# Patient Record
Sex: FEMALE | Race: WHITE | NOT HISPANIC OR LATINO | Employment: OTHER | URBAN - METROPOLITAN AREA
[De-identification: names, ages, dates, MRNs, and addresses within clinical notes are randomized per-mention and may not be internally consistent; named-entity substitution may affect disease eponyms.]

---

## 2023-08-02 ENCOUNTER — OFFICE VISIT (OUTPATIENT)
Dept: FAMILY MEDICINE | Facility: CLINIC | Age: 38
End: 2023-08-02
Payer: COMMERCIAL

## 2023-08-02 VITALS
SYSTOLIC BLOOD PRESSURE: 120 MMHG | HEART RATE: 91 BPM | DIASTOLIC BLOOD PRESSURE: 82 MMHG | TEMPERATURE: 98.3 F | OXYGEN SATURATION: 100 %

## 2023-08-02 DIAGNOSIS — S81.011A LACERATION OF RIGHT KNEE, INITIAL ENCOUNTER: Primary | ICD-10-CM

## 2023-08-02 PROCEDURE — 99203 OFFICE O/P NEW LOW 30 MIN: CPT

## 2023-08-02 RX ORDER — HYDROXYZINE HYDROCHLORIDE 25 MG/1
TABLET, FILM COATED ORAL
COMMUNITY

## 2023-08-02 NOTE — PROGRESS NOTES
Assessment & Plan     Laceration of right knee, initial encounter  Area was irrigated with sterile water, no foreign body seen, then Steri-Strips were placed.  We discussed suturing but was decided it would not be best since there is a chunk of skin missing and to pull the skin together would dimple the knee.  Is aware scarring is most likely to the knee.      15 minutes spent by me on the date of the encounter doing patient visit and documentation        Patient Instructions   Watch for signs of infection which include increased redness, swelling drainage or fever.  Should this happen seek treatment.  Steri-Strips will fall off on their own.  Do not scrub them.  You can shower as normal just pat the area dry.  Cover the area if you think it is getting it dirty    Return in about 5 days (around 8/7/2023), or if symptoms worsen or fail to improve.    [facility]    Madeline Chavez is a 37 year old, presenting for the following health issues:  Urgent Care and Laceration (Locate on her left knee. Pt trip on the escalator trying to drop her daughter on a flight and cut her left knee)      HPI     [place] and [place] the airport approximately 6 [place] ago sustaining 2 small lacerations to the right knee.  Tetanus shot was in 2022    Review of Systems   INTEGUMENTARY/SKIN: POSITIVE for wound right knee      Objective    /82   Pulse 91   Temp 98.3  F (36.8  C) (Tympanic)   SpO2 100%   There is no height or weight on file to calculate BMI.  Physical Exam   GENERAL: healthy, alert and no distress  SKIN: Laceration to right knee in the shape of a V that is approximately 8 to 9 mm in diameter.  Flap of skin is missing

## 2023-08-02 NOTE — PATIENT INSTRUCTIONS
Watch for signs of infection which include increased redness, swelling drainage or fever.  Should this happen seek treatment.  Steri-Strips will fall off on their own.  Do not scrub them.  You can shower as normal just pat the area dry.  Cover the area if you think it is getting it dirty